# Patient Record
Sex: FEMALE | Race: WHITE | Employment: UNEMPLOYED | URBAN - METROPOLITAN AREA
[De-identification: names, ages, dates, MRNs, and addresses within clinical notes are randomized per-mention and may not be internally consistent; named-entity substitution may affect disease eponyms.]

---

## 2022-01-14 ENCOUNTER — HOSPITAL ENCOUNTER (OUTPATIENT)
Dept: LAB | Age: 37
Discharge: HOME | End: 2022-01-14

## 2022-07-20 ENCOUNTER — TRANSCRIBE ORDER (OUTPATIENT)
Dept: SCHEDULING | Age: 37
End: 2022-07-20

## 2022-07-20 DIAGNOSIS — R20.2 PARESTHESIA: Primary | ICD-10-CM

## 2022-07-21 ENCOUNTER — HOSPITAL ENCOUNTER (OUTPATIENT)
Dept: CT IMAGING | Age: 37
Discharge: HOME | End: 2022-07-21
Attending: NURSE PRACTITIONER
Payer: COMMERCIAL

## 2022-07-21 DIAGNOSIS — R20.2 PARESTHESIA: ICD-10-CM

## 2022-07-21 PROCEDURE — 70450 CT HEAD/BRAIN W/O DYE: CPT

## 2022-07-25 ENCOUNTER — TRANSCRIBE ORDER (OUTPATIENT)
Dept: SCHEDULING | Age: 37
End: 2022-07-25

## 2022-07-25 DIAGNOSIS — R20.2 PARESTHESIA OF SKIN: Primary | ICD-10-CM

## 2022-08-06 ENCOUNTER — HOSPITAL ENCOUNTER (OUTPATIENT)
Dept: MRI IMAGING | Age: 37
Discharge: HOME | End: 2022-08-06
Attending: NURSE PRACTITIONER
Payer: COMMERCIAL

## 2022-08-06 DIAGNOSIS — R20.2 PARESTHESIA OF SKIN: ICD-10-CM

## 2022-08-06 PROCEDURE — 70553 MRI BRAIN STEM W/O & W/DYE: CPT

## 2022-08-06 PROCEDURE — A9576 INJ PROHANCE MULTIPACK: HCPCS

## 2022-08-06 PROCEDURE — 74011250636 HC RX REV CODE- 250/636

## 2022-08-06 RX ADMIN — GADOTERIDOL 15 ML: 279.3 INJECTION, SOLUTION INTRAVENOUS at 08:52

## 2022-08-11 ENCOUNTER — TRANSCRIBE ORDER (OUTPATIENT)
Dept: SCHEDULING | Age: 37
End: 2022-08-11

## 2022-08-11 DIAGNOSIS — R20.2 PARESTHESIA OF SKIN: Primary | ICD-10-CM

## 2022-08-28 ENCOUNTER — HOSPITAL ENCOUNTER (OUTPATIENT)
Dept: MRI IMAGING | Age: 37
Discharge: HOME | End: 2022-08-28
Attending: NURSE PRACTITIONER
Payer: COMMERCIAL

## 2022-08-28 DIAGNOSIS — R20.2 PARESTHESIA OF SKIN: ICD-10-CM

## 2022-08-28 PROCEDURE — 72141 MRI NECK SPINE W/O DYE: CPT

## 2022-08-28 PROCEDURE — 72146 MRI CHEST SPINE W/O DYE: CPT

## 2022-10-14 ENCOUNTER — OFFICE VISIT (OUTPATIENT)
Dept: ENDOCRINOLOGY | Age: 37
End: 2022-10-14
Payer: COMMERCIAL

## 2022-10-14 VITALS — HEIGHT: 65 IN | WEIGHT: 181.2 LBS | BODY MASS INDEX: 30.19 KG/M2

## 2022-10-14 DIAGNOSIS — E03.9 ACQUIRED HYPOTHYROIDISM: Primary | ICD-10-CM

## 2022-10-14 PROCEDURE — 99204 OFFICE O/P NEW MOD 45 MIN: CPT | Performed by: GENERAL ACUTE CARE HOSPITAL

## 2022-10-14 RX ORDER — MODAFINIL 200 MG/1
TABLET ORAL
COMMUNITY
Start: 2022-07-13

## 2022-10-14 RX ORDER — DICLOFENAC SODIUM 75 MG/1
TABLET, DELAYED RELEASE ORAL
COMMUNITY
Start: 2022-10-13

## 2022-10-14 RX ORDER — CHOLECALCIFEROL TAB 125 MCG (5000 UNIT) 125 MCG
TAB ORAL DAILY
COMMUNITY

## 2022-10-14 RX ORDER — LEVOTHYROXINE SODIUM 112 UG/1
112 TABLET ORAL
COMMUNITY
Start: 2022-07-05 | End: 2022-11-01 | Stop reason: SDUPTHER

## 2022-10-14 RX ORDER — FLUTICASONE PROPIONATE 50 MCG
SPRAY, SUSPENSION (ML) NASAL
COMMUNITY
Start: 2022-07-14

## 2022-10-14 NOTE — PATIENT INSTRUCTIONS
Levothyroxine medication instructions:  Please take levothyroxine with a glass of water only, on an empty stomach each morning, 1 hour prior to ingesting any other medications (including vitamins), food, coffee, tea, juice or any other beverages. If you use antacids, medicine to treat high cholesterol (including cholestyramine, colesevelam, colestipol), orlistat, sevelamer, sucralfate, stomach medicine (including lansoprazole, omeprazole, pantoprazole), or any medicine that contains calcium or iron, please take it at least 4 hours before or 4 hours after you take levothyroxine. Cottonseed meal, dietary fiber, soybean flour or walnuts may decrease the absorption of this medicine. All of these can reduce the absorption of thyroid hormone tablets and result in fluctuating levels in the blood and on labs, affecting also how one feels. Please do not eat grapefruit or drink grapefruit juice while you are using this medicine. If you miss a dose you can take it as soon as you remember. However, if it is almost time for your next dose, wait until then and take a regular dose. Do not take extra medicine to make up for a missed dose. Store the medicine in a closed container at room temperature, away from heat, moisture, and direct light. Please keep out of children's reach. You may have to take this medicine for 6 to 8 weeks before your symptoms start to get better. Please complete your lab results today and based on that will decide on further management. Once you have decided with your Neurologist on the infusion plan kindly send us a copy of the office note to fax number [phone].

## 2022-10-14 NOTE — LETTER
10/14/2022    Patient: Venessa Ponce   YOB: 1985   Date of Visit: 10/14/2022     Neeraj Zhou NP  [address] [facility] [address]  Via Fax: [phone]    Dear Neeraj Zhou NP,      Thank you for referring Ms. Ny Castellanos to [facility] for evaluation. My notes for this consultation are attached. If you have questions, please do not hesitate to call me. I look forward to following your patient along with you.       Sincerely,    Mk Segal MD

## 2022-10-14 NOTE — PROGRESS NOTES
REFERRED BY: Amada Dahl NP     REASON: Hypothyroidism    CHIEF COMPLAINT: Evaluation for hypothyroidism    HISTORY OF PRESENT ILLNESS:   Marcy Banks is a 39 y.o. female with a PMHx as noted below who was referred to our endocrinology clinic for evaluation of hypothyroidism. Following with PCP since diagnosis    Patient complaining of wt loss and her thyroid dose needed adjustment as it was too high for her    Multiple Sclerosis recently diagnosed by Neurologist Dr Char Morin per patient, she had numbness on Rt side of the face, she has a plan to get the infusion treatment but date is not set yet    Thyroid History:  Diagnosed in 1998  Patient denies history of radiation exposure or trauma/surgery  Family history of thyroid disease is Positive sister hypothyroidism  Currently taking levothyroxine 112mcg daily  The patient admits to taking it 1 hour before breakfast on an empty stomach. Patient denies palpitations, diaphoresis, weight changes, fatigue, tremor, or chest discomfort. Hair loss ongoing stable  Patient lost 50lbs in the past 1.5 yrs not intentional, she did have a lower appetite and more physical activity  Menstrual cycle normal and regular    06/29/2022  TSH 0.272  FT4 1.57  TT3  81  No dose adjustment was made with these test results, she has been on the same dose this year    Review of most recent thyroid function:  No results found for: TSH, FT4, T4, [id number], T4, 177202, T3LT, 530174, TSILT, TRALT, TMCLT, TGAB, TSHEXT, TSHEXT   Thyroid Lab Key:  TSILT = Thyroid stimulating antibodies  TRALT = TSH Receptor Antibodies  TMCLT = TPO antibodies  T3LT = Total T3 levels  770343 = Direct FT4  919905 = Free T3    PAST MEDICAL/SURGICAL HISTORY:   No past medical history on file. No past surgical history on file.     ALLERGIES:   Allergies   Allergen Reactions    Amoxicillin Rash       MEDICATIONS ON ADMISSION:     Current Outpatient Medications:     diclofenac EC (VOLTAREN) 75 mg EC tablet, , Disp: , Rfl: fluticasone propionate (FLONASE) 50 mcg/actuation nasal spray, SHAKE LIQUID AND USE 1 SPRAY IN EACH NOSTRIL EVERY DAY FOR 3 TO 4 DAYS THEN AS NEEDED, Disp: , Rfl:     modafiniL (PROVIGIL) 200 mg tablet, TAKE 1/2 TO 1 TABLET BY MOUTH EVERY MORNING. MAY TAKE AN ADDITIONAL 1/2 TO 1 TABLET AT NOONTIME IF NEEDED, Disp: , Rfl:     levothyroxine (SYNTHROID) 112 mcg tablet, Take 112 mcg by mouth., Disp: , Rfl:     cholecalciferol (VITAMIN D3) (5000 Units/125 mcg) tab tablet, Take  by mouth daily. , Disp: , Rfl:     SOCIAL HISTORY:   Social History     Socioeconomic History    Marital status:      Spouse name: Not on file    Number of children: Not on file    Years of education: Not on file    Highest education level: Not on file   Occupational History    Not on file   Tobacco Use    Smoking status: Never    Smokeless tobacco: Never   Substance and Sexual Activity    Alcohol use: Not on file    Drug use: Not on file    Sexual activity: Not on file   Other Topics Concern    Not on file   Social History Narrative    Not on file     Social Determinants of Health     Financial Resource Strain: Not on file   Food Insecurity: Not on file   Transportation Needs: Not on file   Physical Activity: Not on file   Stress: Not on file   Social Connections: Not on file   Intimate Partner Violence: Not on file   Housing Stability: Not on file       FAMILY HISTORY:  No family history on file. REVIEW OF SYSTEMS: Complete ROS assessed and noted for that which is described above, all else are negative.   Eyes: normal  ENT: normal  CVS: normal  Resp: normal  GI: normal  : normal  GYN: normal  Endocrine: normal  Integument: normal  Musculoskeletal: normal  Neuro: normal  Psych: normal      PHYSICAL EXAMINATION:    VITAL SIGNS:  Visit Vitals  Ht 5' 5\" (1.651 m)   Wt 181 lb 3.2 oz (82.2 kg)   BMI 30.15 kg/m²     Last 3 Recorded Weights in this Encounter    10/14/22 0831   Weight: 181 lb 3.2 oz (82.2 kg)        GENERAL: NCAT, Sitting comfortably, NAD  EYES: EOMI, non-icteric, no proptosis  Ear/Nose/Throat: NCAT, no inflammation, no masses, thyroid gland is not enlarged, trachea central, no LAD  LYMPH NODES: No LAD  CARDIOVASCULAR: S1 S2, RRR, No murmur, 2+ radial pulses  RESPIRATORY: CTA b/l, no wheeze/rales  GASTROINTESTINAL: ND  MUSCULOSKELETAL: Normal ROM, no atrophy  SKIN: warm, no edema/rash/ or other skin changes  NEUROLOGIC: 5/5 power all extremities, no tremor, AAOx3  PSYCHIATRIC: Normal affect, Normal insight and judgement       REVIEW OF LABORATORY AND RADIOLOGY DATA:   Labs and documentation have been reviewed as described above. ASSESSMENT AND PLAN:   Misa Rae is a 39 y.o. female with a PMHx as noted above who was referred to our endocrinology clinic for evaluation of hypothyroidism. Hypothyroidism    Today, we spent time discussing the physiologic mechanisms which govern thyroid hormone regulation and the normal responses to abnormal thyroid function. We also discussed their current condition in the setting of this physiologic response. Today we will check a TSH & FT4 level to determine if patient is euthyroid on current regimen. Continue with 112 mcg of levothyroxine daily, may switch to name brand to ensure adequate absorption levels  Once she is on the infusion treatment she may have alterations to her thyroid levels due to that, asked to have dr Mei Jacobo fax over the office notes in her next visit with them  Discussed the best way to take thyroid hormone each morning.     Plan to RTC in 3 months    Aranza Begum MD   [facility]

## 2022-10-15 LAB
T4 FREE SERPL-MCNC: 1.91 NG/DL (ref 0.82–1.77)
THYROPEROXIDASE AB SERPL-ACNC: <8 IU/ML (ref 0–34)
TSH SERPL DL<=0.005 MIU/L-ACNC: 0.34 UIU/ML (ref 0.45–4.5)

## 2022-11-01 DIAGNOSIS — E03.9 ACQUIRED HYPOTHYROIDISM: Primary | ICD-10-CM

## 2022-11-01 RX ORDER — LEVOTHYROXINE SODIUM 112 UG/1
TABLET ORAL
Qty: 26 TABLET | Refills: 1 | Status: SHIPPED | OUTPATIENT
Start: 2022-11-01 | End: 2022-11-04 | Stop reason: SDUPTHER

## 2022-11-02 ENCOUNTER — PATIENT MESSAGE (OUTPATIENT)
Dept: ENDOCRINOLOGY | Age: 37
End: 2022-11-02

## 2022-11-02 DIAGNOSIS — E03.9 ACQUIRED HYPOTHYROIDISM: ICD-10-CM

## 2022-11-04 RX ORDER — LEVOTHYROXINE SODIUM 112 UG/1
TABLET ORAL
Qty: 26 TABLET | Refills: 2 | Status: SHIPPED | OUTPATIENT
Start: 2022-11-04

## 2022-12-27 DIAGNOSIS — E03.9 ACQUIRED HYPOTHYROIDISM: ICD-10-CM

## 2023-01-17 ENCOUNTER — OFFICE VISIT (OUTPATIENT)
Dept: ENDOCRINOLOGY | Age: 38
End: 2023-01-17
Payer: COMMERCIAL

## 2023-01-17 VITALS
DIASTOLIC BLOOD PRESSURE: 62 MMHG | HEART RATE: 76 BPM | HEIGHT: 65 IN | SYSTOLIC BLOOD PRESSURE: 95 MMHG | BODY MASS INDEX: 30.62 KG/M2 | WEIGHT: 183.8 LBS

## 2023-01-17 DIAGNOSIS — E03.9 ACQUIRED HYPOTHYROIDISM: Primary | ICD-10-CM

## 2023-01-17 PROCEDURE — 99213 OFFICE O/P EST LOW 20 MIN: CPT | Performed by: GENERAL ACUTE CARE HOSPITAL

## 2023-01-17 RX ORDER — LEVOTHYROXINE SODIUM 112 UG/1
TABLET ORAL
Qty: 90 TABLET | Refills: 2 | Status: SHIPPED | OUTPATIENT
Start: 2023-01-17

## 2023-01-17 NOTE — PROGRESS NOTES
REFERRED BY: Jennifer Oseguera NP     REASON: Hypothyroidism    CHIEF COMPLAINT: Evaluation for hypothyroidism    HISTORY OF PRESENT ILLNESS:   Liliana Varner is a 40 y.o. female with a PMHx as noted below who was referred to our endocrinology clinic for evaluation of hypothyroidism. Following with PCP since diagnosis    In the last visit we cut back with Tesha's levothyroxine 112 mcg from 7 days a week to 6 days a week and she has been taking that, she mentions she feels really tired on the day she does not take her levothyroxine but denies any other complaint at this time, her menstrual cycle has been regular, her weight has been steady. She is scheduled to start infusion treatment for her multiple sclerosis with Dr. Yana Odell in 1 week, indicates no steroids will be used at that time    Component      Latest Ref Rng & Units 1/12/2023 10/14/2022   T4, Free      0.82 - 1.77 ng/dL 1.07 1.91 (H)   TSH      0.450 - 4.500 uIU/mL 5.200 (H) 0.339 (L)   Thyroid peroxidase Ab      0 - 34 IU/mL  <8       On presentation:  Multiple Sclerosis recently diagnosed by Neurologist Dr Yana Odell per patient, she had numbness on Rt side of the face, she has a plan to get the infusion treatment but date is not set yet      Thyroid History:  Diagnosed in 1998  Patient denies history of radiation exposure or trauma/surgery  Family history of thyroid disease is Positive sister hypothyroidism  Currently taking levothyroxine 112mcg daily  The patient admits to taking it 1 hour before breakfast on an empty stomach. Patient denies palpitations, diaphoresis, weight changes, fatigue, tremor, or chest discomfort.  Hair loss ongoing stable  Patient lost 50lbs in the past 1.5 yrs not intentional, she did have a lower appetite and more physical activity  Menstrual cycle normal and regular    06/29/2022  TSH 0.272  FT4 1.57  TT3  81  No dose adjustment was made with these test results, she has been on the same dose this year    Review of most recent thyroid function:  Lab Results   Component Value Date    TSH 5.200 (H) 01/12/2023    TSH 0.339 (L) 10/14/2022    FT4 1.07 01/12/2023    FT4 1.91 (H) 10/14/2022    TMCLT <8 10/14/2022      Thyroid Lab Key:  TSILT = Thyroid stimulating antibodies  TRALT = TSH Receptor Antibodies  TMCLT = TPO antibodies  T3LT = Total T3 levels  654358 = Direct FT4  330970 = Free T3    PAST MEDICAL/SURGICAL HISTORY:   No past medical history on file. No past surgical history on file. ALLERGIES:   Allergies   Allergen Reactions    Amoxicillin Rash       MEDICATIONS ON ADMISSION:     Current Outpatient Medications:     levothyroxine (SYNTHROID) 112 mcg tablet, Take daily for 6 days a week only, Disp: 26 Tablet, Rfl: 2    modafiniL (PROVIGIL) 200 mg tablet, TAKE 1/2 TO 1 TABLET BY MOUTH EVERY MORNING. MAY TAKE AN ADDITIONAL 1/2 TO 1 TABLET AT NOONTIME IF NEEDED, Disp: , Rfl:     cholecalciferol (VITAMIN D3) (5000 Units/125 mcg) tab tablet, Take  by mouth daily. , Disp: , Rfl:     diclofenac EC (VOLTAREN) 75 mg EC tablet, , Disp: , Rfl:     fluticasone propionate (FLONASE) 50 mcg/actuation nasal spray, SHAKE LIQUID AND USE 1 SPRAY IN EACH NOSTRIL EVERY DAY FOR 3 TO 4 DAYS THEN AS NEEDED (Patient not taking: Reported on 1/17/2023), Disp: , Rfl:     SOCIAL HISTORY:   Social History     Socioeconomic History    Marital status: LEGALLY      Spouse name: Not on file    Number of children: Not on file    Years of education: Not on file    Highest education level: Not on file   Occupational History    Not on file   Tobacco Use    Smoking status: Never    Smokeless tobacco: Never   Substance and Sexual Activity    Alcohol use: Not on file    Drug use: Not on file    Sexual activity: Not on file   Other Topics Concern    Not on file   Social History Narrative    Not on file     Social Determinants of Health     Financial Resource Strain: Not on file   Food Insecurity: Not on file   Transportation Needs: Not on file   Physical Activity: Not on file   Stress: Not on file   Social Connections: Not on file   Intimate Partner Violence: Not on file   Housing Stability: Not on file       FAMILY HISTORY:  No family history on file. REVIEW OF SYSTEMS: Complete ROS assessed and noted for that which is described above, all else are negative. Eyes: normal  ENT: normal  CVS: normal  Resp: normal  GI: normal  : normal  GYN: normal  Endocrine: normal  Integument: normal  Musculoskeletal: normal  Neuro: normal  Psych: normal      PHYSICAL EXAMINATION:    VITAL SIGNS:  Visit Vitals  BP 95/62   Pulse 76   Ht 5' 5\" (1.651 m)   Wt 183 lb 12.8 oz (83.4 kg)   BMI 30.59 kg/m²       Last 3 Recorded Weights in this Encounter    01/17/23 0913   Weight: 183 lb 12.8 oz (83.4 kg)          GENERAL: NCAT, Sitting comfortably, NAD  EYES: EOMI, non-icteric, no proptosis  Ear/Nose/Throat: NCAT, no inflammation, no masses, thyroid gland is not enlarged, trachea central, no LAD  LYMPH NODES: No LAD  CARDIOVASCULAR: S1 S2, RRR, No murmur, 2+ radial pulses  RESPIRATORY: CTA b/l, no wheeze/rales  GASTROINTESTINAL: ND  MUSCULOSKELETAL: Normal ROM, no atrophy  SKIN: warm, no edema/rash/ or other skin changes  NEUROLOGIC: 5/5 power all extremities, no tremor, AAOx3  PSYCHIATRIC: Normal affect, Normal insight and judgement       REVIEW OF LABORATORY AND RADIOLOGY DATA:   Labs and documentation have been reviewed as described above. ASSESSMENT AND PLAN:   Black Becerra is a 40 y.o. female with a PMHx as noted above who was referred to our endocrinology clinic for evaluation of hypothyroidism. Hypothyroidism    Given her TSH is above normal and she feels tired on the day she skips her levothyroxine plan to adjust her thyroid medication as follows:  - levothyroxine 112 mcg for 1 tablet daily Monday to Saturday and take half a tablet on Sunday.   Repeat labs 1 week before the next appointment in 8 weeks    Once she is on the infusion treatment she may have alterations to her thyroid levels due to that, asked to have dr Culver Fails fax over the office notes in her next visit with them  Discussed the best way to take thyroid hormone each morning.     Plan to RTC in 3 months    Jocelyn Aly MD   [facility]

## 2023-01-17 NOTE — PATIENT INSTRUCTIONS
Please take levothyroxine 112 mcg for 1 tablet daily Monday to Saturday and take half a tablet on Sunday. PPlease repeat the lab levels 1 week before your upcoming appointment . Please follow the instructions below on how to take it for the best consistent levels:      Levothyroxine medication instructions:  Please take levothyroxine with a glass of water only, on an empty stomach each morning, 1 hour prior to ingesting any other medications (including vitamins), food, coffee, tea, juice or any other beverages. If you use antacids, medicine to treat high cholesterol (including cholestyramine, colesevelam, colestipol), orlistat, sevelamer, sucralfate, stomach medicine (including lansoprazole, omeprazole, pantoprazole), or any medicine that contains calcium or iron, please take it at least 4 hours before or 4 hours after you take levothyroxine. Cottonseed meal, dietary fiber, soybean flour or walnuts may decrease the absorption of this medicine. All of these can reduce the absorption of thyroid hormone tablets and result in fluctuating levels in the blood and on labs, affecting also how one feels. Please do not eat grapefruit or drink grapefruit juice while you are using this medicine. If you miss a dose you can take it as soon as you remember. However, if it is almost time for your next dose, wait until then and take a regular dose. Do not take extra medicine to make up for a missed dose. Store the medicine in a closed container at room temperature, away from heat, moisture, and direct light. Please keep out of children's reach. You may have to take this medicine for 6 to 8 weeks before your symptoms start to get better. Once you have decided with your Neurologist on the infusion plan kindly send us a copy of the office note to fax number [phone].

## 2023-03-17 DIAGNOSIS — E03.9 ACQUIRED HYPOTHYROIDISM: ICD-10-CM

## 2023-03-18 LAB
T4 FREE SERPL-MCNC: 1.1 NG/DL (ref 0.82–1.77)
TSH SERPL DL<=0.005 MIU/L-ACNC: 9.02 UIU/ML (ref 0.45–4.5)

## 2023-03-21 ENCOUNTER — OFFICE VISIT (OUTPATIENT)
Dept: ENDOCRINOLOGY | Age: 38
End: 2023-03-21
Payer: COMMERCIAL

## 2023-03-21 VITALS
HEIGHT: 66 IN | BODY MASS INDEX: 31.47 KG/M2 | DIASTOLIC BLOOD PRESSURE: 73 MMHG | WEIGHT: 195.8 LBS | HEART RATE: 74 BPM | SYSTOLIC BLOOD PRESSURE: 119 MMHG

## 2023-03-21 DIAGNOSIS — E03.9 ACQUIRED HYPOTHYROIDISM: Primary | ICD-10-CM

## 2023-03-21 PROCEDURE — 99213 OFFICE O/P EST LOW 20 MIN: CPT | Performed by: GENERAL ACUTE CARE HOSPITAL

## 2023-03-21 RX ORDER — LEVOTHYROXINE SODIUM 112 UG/1
TABLET ORAL
Qty: 100 TABLET | Refills: 2 | Status: SHIPPED | OUTPATIENT
Start: 2023-03-21

## 2023-03-21 NOTE — LETTER
3/21/2023    Patient: Zehra Rich   YOB: 1985   Date of Visit: 3/21/2023     La Nena Dinh NP  [address] [facility] [address]  Via Fax: [phone]    Dear La Nena Dinh NP,      Thank you for referring Ms. Cherylene Edison to [facility] for evaluation. My notes for this consultation are attached. If you have questions, please do not hesitate to call me. I look forward to following your patient along with you.       Sincerely,    Sparkle England MD

## 2023-03-21 NOTE — PROGRESS NOTES
REFERRED BY: Wilner Pickering NP     REASON: Hypothyroidism    CHIEF COMPLAINT: Evaluation for hypothyroidism    HISTORY OF PRESENT ILLNESS:   Alexsander Vera is a 40 y.o. female with a PMHx as noted below who was referred to our endocrinology clinic for evaluation of hypothyroidism. Following with PCP since diagnosis    In the last visit we cut back with Tesha's levothyroxine 112 mcg from 7 days a week to 6 days a week and half tablet on Sunday, she has started her infusion of Ocrelizumab for MS and had 2 infusions on (03/8 and 03/22) and received methylprednisolone 125mcg daily. She has been taking that, she mentions she feels really tired on the day she does not take her levothyroxine but denies any other complaint at this time, her menstrual cycle has been regular, her weight has been steady. Component      Latest Ref Rng & Units 3/17/2023 1/12/2023 10/14/2022   Thyroid peroxidase Ab      0 - 34 IU/mL   <8   TSH      0.450 - 4.500 uIU/mL 9.020 (H) 5.200 (H)    T4, Free      0.82 - 1.77 ng/dL 1.10 1.07        On presentation:  Multiple Sclerosis recently diagnosed by Neurologist Dr Thad Ding per patient, she had numbness on Rt side of the face, she has a plan to get the infusion treatment but date is not set yet      Thyroid History:  Diagnosed in 1998  Patient denies history of radiation exposure or trauma/surgery  Family history of thyroid disease is Positive sister hypothyroidism  Currently taking levothyroxine 112mcg daily  The patient admits to taking it 1 hour before breakfast on an empty stomach. Patient denies palpitations, diaphoresis, weight changes, fatigue, tremor, or chest discomfort.  Hair loss ongoing stable  Patient lost 50lbs in the past 1.5 yrs not intentional, she did have a lower appetite and more physical activity  Menstrual cycle normal and regular    06/29/2022  TSH 0.272  FT4 1.57  TT3  81  No dose adjustment was made with these test results, she has been on the same dose this year    Review of most recent thyroid function:  Lab Results   Component Value Date    TSH 9.020 (H) 03/17/2023    TSH 5.200 (H) 01/12/2023    TSH 0.339 (L) 10/14/2022    FT4 1.10 03/17/2023    FT4 1.07 01/12/2023    FT4 1.91 (H) 10/14/2022    TMCLT <8 10/14/2022      Thyroid Lab Key:  TSILT = Thyroid stimulating antibodies  TRALT = TSH Receptor Antibodies  TMCLT = TPO antibodies  T3LT = Total T3 levels  230363 = Direct FT4  266440 = Free T3    PAST MEDICAL/SURGICAL HISTORY:   No past medical history on file. No past surgical history on file. ALLERGIES:   Allergies   Allergen Reactions    Amoxicillin Rash       MEDICATIONS ON ADMISSION:     Current Outpatient Medications:     ocrelizumab (OCREVUS IV), by IntraVENous route every 6 months., Disp: , Rfl:     levothyroxine (SYNTHROID) 112 mcg tablet, Take daily for 6 days a week Mon to Sat and take half tablet on Sunday, Disp: 90 Tablet, Rfl: 2    modafiniL (PROVIGIL) 200 mg tablet, TAKE 1/2 TO 1 TABLET BY MOUTH EVERY MORNING. MAY TAKE AN ADDITIONAL 1/2 TO 1 TABLET AT NOONTIME IF NEEDED, Disp: , Rfl:     cholecalciferol (VITAMIN D3) (5000 Units/125 mcg) tab tablet, Take  by mouth daily. , Disp: , Rfl:     diclofenac EC (VOLTAREN) 75 mg EC tablet, , Disp: , Rfl:     fluticasone propionate (FLONASE) 50 mcg/actuation nasal spray, SHAKE LIQUID AND USE 1 SPRAY IN EACH NOSTRIL EVERY DAY FOR 3 TO 4 DAYS THEN AS NEEDED (Patient not taking: No sig reported), Disp: , Rfl:     SOCIAL HISTORY:   Social History     Socioeconomic History    Marital status: LEGALLY      Spouse name: Not on file    Number of children: Not on file    Years of education: Not on file    Highest education level: Not on file   Occupational History    Not on file   Tobacco Use    Smoking status: Never    Smokeless tobacco: Never   Substance and Sexual Activity    Alcohol use: Not on file    Drug use: Not on file    Sexual activity: Not on file   Other Topics Concern    Not on file   Social History Narrative    Not on file     Social Determinants of Health     Financial Resource Strain: Not on file   Food Insecurity: Not on file   Transportation Needs: Not on file   Physical Activity: Not on file   Stress: Not on file   Social Connections: Not on file   Intimate Partner Violence: Not on file   Housing Stability: Not on file       FAMILY HISTORY:  No family history on file. REVIEW OF SYSTEMS: Complete ROS assessed and noted for that which is described above, all else are negative. Eyes: normal  ENT: normal  CVS: normal  Resp: normal  GI: normal  : normal  GYN: normal  Endocrine: normal  Integument: normal  Musculoskeletal: normal  Neuro: normal  Psych: normal      PHYSICAL EXAMINATION:    VITAL SIGNS:  Visit Vitals  /73   Pulse 74   Ht 5' 6\" (1.676 m)   Wt 195 lb 12.8 oz (88.8 kg)   BMI 31.60 kg/m²       Last 3 Recorded Weights in this Encounter    03/21/23 1003   Weight: 195 lb 12.8 oz (88.8 kg)          GENERAL: NCAT, Sitting comfortably, NAD  EYES: EOMI, non-icteric, no proptosis  Ear/Nose/Throat: NCAT, no inflammation, no masses, thyroid gland is not enlarged, trachea central, no LAD  LYMPH NODES: No LAD  CARDIOVASCULAR: S1 S2, RRR, No murmur, 2+ radial pulses  RESPIRATORY: CTA b/l, no wheeze/rales  GASTROINTESTINAL: ND  MUSCULOSKELETAL: Normal ROM, no atrophy  SKIN: warm, no edema/rash/ or other skin changes  NEUROLOGIC: 5/5 power all extremities, no tremor, AAOx3  PSYCHIATRIC: Normal affect, Normal insight and judgement     REVIEW OF LABORATORY AND RADIOLOGY DATA:   Labs and documentation have been reviewed as described above. ASSESSMENT AND PLAN:   Crissy Francois is a 40 y.o. female with a PMHx as noted above who was referred to our endocrinology clinic for evaluation of hypothyroidism. Hypothyroidism    Given her TSH is elevated increase the levothyroxine 112 mcg for 1 tablet daily and take additional half a tablet on Sunday.   Repeat labs 1 week before the next appointment in 8 weeks    Once she is on the infusion treatment she may have alterations to her thyroid levels due to that  Discussed the best way to take thyroid hormone each morning.     Plan to RTC in 3 months    Shekhar Ruiz MD   [facility]

## 2023-03-21 NOTE — PATIENT INSTRUCTIONS
Please take levothyroxine 112 mcg daily and take an additional half a tablet on Sunday. Please repeat the lab levels in 8 weeks (2-3 days before the next appt). Please follow the instructions below on how to take it for the best consistent levels:      Levothyroxine medication instructions:  Please take levothyroxine with a glass of water only, on an empty stomach each morning, 1 hour prior to ingesting any other medications (including vitamins), food, coffee, tea, juice or any other beverages. If you use antacids, medicine to treat high cholesterol (including cholestyramine, colesevelam, colestipol), orlistat, sevelamer, sucralfate, stomach medicine (including lansoprazole, omeprazole, pantoprazole), or any medicine that contains calcium or iron, please take it at least 4 hours before or 4 hours after you take levothyroxine. Cottonseed meal, dietary fiber, soybean flour or walnuts may decrease the absorption of this medicine. All of these can reduce the absorption of thyroid hormone tablets and result in fluctuating levels in the blood and on labs, affecting also how one feels. Please do not eat grapefruit or drink grapefruit juice while you are using this medicine. If you miss a dose you can take it as soon as you remember. However, if it is almost time for your next dose, wait until then and take a regular dose. Do not take extra medicine to make up for a missed dose. Store the medicine in a closed container at room temperature, away from heat, moisture, and direct light. Please keep out of children's reach. You may have to take this medicine for 6 to 8 weeks before your symptoms start to get better.

## 2023-05-12 LAB
T4 FREE SERPL-MCNC: 1.48 NG/DL (ref 0.82–1.77)
TSH SERPL DL<=0.005 MIU/L-ACNC: 6.14 UIU/ML (ref 0.45–4.5)

## 2023-05-16 DIAGNOSIS — E03.9 ACQUIRED HYPOTHYROIDISM: ICD-10-CM

## 2023-05-16 NOTE — LETTER
1/17/2023    Patient: Black Becerra   YOB: 1985   Date of Visit: 1/17/2023     Shobha Ramos NP  [address] [facility] [address]  Via Fax: [phone]    Dear Shobha Ramos NP,      Thank you for referring Ms. Andrew Schroeder to [facility] for evaluation. My notes for this consultation are attached. If you have questions, please do not hesitate to call me. I look forward to following your patient along with you.       Sincerely,    Crys Jean MD
C/O left lower back pain that radiates down her leg after twisting the wrong way while doing yard work.

## 2023-05-17 ENCOUNTER — OFFICE VISIT (OUTPATIENT)
Age: 38
End: 2023-05-17
Payer: MEDICAID

## 2023-05-17 VITALS
BODY MASS INDEX: 31.07 KG/M2 | HEIGHT: 66 IN | DIASTOLIC BLOOD PRESSURE: 61 MMHG | HEART RATE: 70 BPM | SYSTOLIC BLOOD PRESSURE: 90 MMHG | WEIGHT: 193.3 LBS

## 2023-05-17 DIAGNOSIS — E03.9 ACQUIRED HYPOTHYROIDISM: Primary | ICD-10-CM

## 2023-05-17 PROCEDURE — 99213 OFFICE O/P EST LOW 20 MIN: CPT | Performed by: GENERAL ACUTE CARE HOSPITAL

## 2023-05-17 RX ORDER — LEVOTHYROXINE SODIUM 112 UG/1
TABLET ORAL
Qty: 102 TABLET | Refills: 0 | Status: SHIPPED | OUTPATIENT
Start: 2023-05-17

## 2023-05-17 NOTE — PROGRESS NOTES
recognition software. Quite often unanticipated grammatical, syntax, homophones, and other interpretive errors are inadvertently transcribed by the computer software. Efforts were made to correct these errors in proofreading. Please excuse any errors that have escaped final proofreading. Thank you.      Jeremy Buck MD   [facility]

## 2023-05-17 NOTE — PATIENT INSTRUCTIONS
Please increase the levothyroxine to 112mcg 1 tab daily on Sat take 2 tabs. Please complete the blood test in 12 weeks and based on the results will decide on the next steps in management. Levothyroxine medication instructions:  Please take levothyroxine with a glass of water only, on an empty stomach each morning, 1 hour prior to ingesting any other medications (including vitamins), food, coffee, tea, juice or any other beverages. If you use antacids, medicine to treat high cholesterol (including cholestyramine, colesevelam, colestipol), orlistat, sevelamer, sucralfate, stomach medicine (including lansoprazole, omeprazole, pantoprazole), or any medicine that contains calcium or iron, please take it at least 4 hours before or 4 hours after you take levothyroxine. Cottonseed meal, dietary fiber, soybean flour or walnuts may decrease the absorption of this medicine. All of these can reduce the absorption of thyroid hormone tablets and result in fluctuating levels in the blood and on labs, affecting also how one feels. Please do not eat grapefruit or drink grapefruit juice while you are using this medicine. If you miss a dose you can take it as soon as you remember. However, if it is almost time for your next dose, wait until then and take a regular dose. Do not take extra medicine to make up for a missed dose. Store the medicine in a closed container at room temperature, away from heat, moisture, and direct light. Please keep out of children's reach. You may have to take this medicine for 6 to 8 weeks before your symptoms start to get better.

## 2023-07-11 DIAGNOSIS — E03.9 ACQUIRED HYPOTHYROIDISM: ICD-10-CM

## 2023-08-09 DIAGNOSIS — E03.9 ACQUIRED HYPOTHYROIDISM: ICD-10-CM

## 2023-08-11 LAB
T4 FREE SERPL-MCNC: 1.46 NG/DL (ref 0.82–1.77)
TSH SERPL DL<=0.005 MIU/L-ACNC: 0.87 UIU/ML (ref 0.45–4.5)

## 2023-08-22 ENCOUNTER — OFFICE VISIT (OUTPATIENT)
Age: 38
End: 2023-08-22
Payer: MEDICAID

## 2023-08-22 VITALS
HEART RATE: 68 BPM | SYSTOLIC BLOOD PRESSURE: 116 MMHG | HEIGHT: 66 IN | WEIGHT: 193.6 LBS | BODY MASS INDEX: 31.12 KG/M2 | DIASTOLIC BLOOD PRESSURE: 74 MMHG

## 2023-08-22 DIAGNOSIS — E03.9 ACQUIRED HYPOTHYROIDISM: Primary | ICD-10-CM

## 2023-08-22 PROCEDURE — 99213 OFFICE O/P EST LOW 20 MIN: CPT | Performed by: GENERAL ACUTE CARE HOSPITAL

## 2023-08-22 RX ORDER — LEVOTHYROXINE SODIUM 112 UG/1
TABLET ORAL
Qty: 102 TABLET | Refills: 1 | Status: SHIPPED | OUTPATIENT
Start: 2023-08-22

## 2023-08-22 NOTE — PATIENT INSTRUCTIONS
Keep up the good work! Continue the current dose of levothyroxine 112mcg and 2 tabs on Saturday  Please obtain blood test 1 week before the next visit    Levothyroxine medication instructions:  Please take levothyroxine with a glass of water only, on an empty stomach each morning, 1 hour prior to ingesting any other medications (including vitamins), food, coffee, tea, juice or any other beverages. If you use antacids, medicine to treat high cholesterol (including cholestyramine, colesevelam, colestipol), orlistat, sevelamer, sucralfate, stomach medicine (including lansoprazole, omeprazole, pantoprazole), or any medicine that contains calcium or iron, please take it at least 4 hours before or 4 hours after you take levothyroxine. Cottonseed meal, dietary fiber, soybean flour or walnuts may decrease the absorption of this medicine. All of these can reduce the absorption of thyroid hormone tablets and result in fluctuating levels in the blood and on labs, affecting also how one feels. Please do not eat grapefruit or drink grapefruit juice while you are using this medicine. If you miss a dose you can take it as soon as you remember. However, if it is almost time for your next dose, wait until then and take a regular dose. Do not take extra medicine to make up for a missed dose. Store the medicine in a closed container at room temperature, away from heat, moisture, and direct light. Please keep out of children's reach. You may have to take this medicine for 6 to 8 weeks before your symptoms start to get better.

## 2023-08-22 NOTE — PROGRESS NOTES
continue the same dose of levothyroxine 112 mcg for 1 extra tablet on Saturday. Repeat labs 1 week before the next appointment   Discussed the best way to take thyroid hormone each morning. Plan to RTC in 4 months    Please note that this dictation was completed with [employer], the computer voice recognition software. Quite often unanticipated grammatical, syntax, homophones, and other interpretive errors are inadvertently transcribed by the computer software. Efforts were made to correct these errors in proofreading. Please excuse any errors that have escaped final proofreading. Thank you. Lg Morales MD   [facility]     Patient Instructions   Keep up the good work! Continue the current dose of levothyroxine 112mcg and 2 tabs on Saturday  Please obtain blood test 1 week before the next visit    Levothyroxine medication instructions:  Please take levothyroxine with a glass of water only, on an empty stomach each morning, 1 hour prior to ingesting any other medications (including vitamins), food, coffee, tea, juice or any other beverages. If you use antacids, medicine to treat high cholesterol (including cholestyramine, colesevelam, colestipol), orlistat, sevelamer, sucralfate, stomach medicine (including lansoprazole, omeprazole, pantoprazole), or any medicine that contains calcium or iron, please take it at least 4 hours before or 4 hours after you take levothyroxine. Cottonseed meal, dietary fiber, soybean flour or walnuts may decrease the absorption of this medicine. All of these can reduce the absorption of thyroid hormone tablets and result in fluctuating levels in the blood and on labs, affecting also how one feels. Please do not eat grapefruit or drink grapefruit juice while you are using this medicine. If you miss a dose you can take it as soon as you remember. However, if it is almost time for your next dose, wait until then and take a regular dose.  Do not take extra

## 2024-01-09 LAB
T4 FREE SERPL-MCNC: 1.6 NG/DL (ref 0.82–1.77)
TSH SERPL DL<=0.005 MIU/L-ACNC: 0.42 UIU/ML (ref 0.45–4.5)

## 2024-01-22 DIAGNOSIS — E03.9 ACQUIRED HYPOTHYROIDISM: ICD-10-CM

## 2024-01-23 ENCOUNTER — TELEMEDICINE (OUTPATIENT)
Age: 39
End: 2024-01-23
Payer: MEDICAID

## 2024-01-23 DIAGNOSIS — E03.9 ACQUIRED HYPOTHYROIDISM: Primary | ICD-10-CM

## 2024-01-23 PROCEDURE — 99213 OFFICE O/P EST LOW 20 MIN: CPT | Performed by: GENERAL ACUTE CARE HOSPITAL

## 2024-01-23 RX ORDER — LEVOTHYROXINE SODIUM 112 UG/1
TABLET ORAL
Qty: 90 TABLET | Refills: 3 | Status: SHIPPED | OUTPATIENT
Start: 2024-01-23

## 2024-01-23 NOTE — PROGRESS NOTES
[facility]  DR LIUDMILA Hintonmaribell Kwok, was evaluated through a synchronous (real-time) audio-video encounter. The patient (or guardian if applicable) is aware that this is a billable service, which includes applicable co-pays. This Virtual Visit was conducted with patient's (and/or legal guardian's) consent. Patient identification was verified, and a caregiver was present when appropriate.   The patient was located at Home: [address] [place] [address]  Provider was located at Facility (Appt Dept): [address] [place] [address] [address]  [place] [address]    --Liudmila Santos MD on 1/23/2024 at 9:44 AM    An electronic signature was used to authenticate this note.     REFERRED BY: May Blanco NP     REASON: Hypothyroidism    CHIEF COMPLAINT: Evaluation for hypothyroidism    HISTORY OF PRESENT ILLNESS:   Delmi Kwok is a 38 y.o. female with a PMHx as noted below who was referred to our endocrinology clinic for evaluation of hypothyroidism.    1/23/24    Ms Awad denies new complaints, has been compliant with levothyroxine 112 mcg daily with 2 tabs on Saturday  Denies symptoms of hyperthyroidism    Review of most recent thyroid function:  Lab Results   Component Value Date    TSH 0.423 (L) 01/08/2024    TSH 0.874 08/10/2023    TSH 6.140 (H) 05/11/2023    T4FREE 1.60 01/08/2024    T4FREE 1.46 08/10/2023    T4FREE 1.48 05/11/2023      Thyroid Lab Key:  TSILT = Thyroid stimulating antibodies  TRALT = TSH Receptor Antibodies  TMCLT = TPO antibodies  T3LT = Total T3 levels  [id number] = Direct FT4  [id number] = Free T3         8/22/23    Feels tired, denies other new complaints. She takes levothyroxine correctly.     Component      Latest Ref Rng 5/11/2023 8/10/2023   TSH      0.450 - 4.500 uIU/mL 6.140 (H)  0.874    T4 Free      0.82 - 1.77 ng/dL 1.48  1.46       Legend:  (H) High    Previous visit 05/17/23:  Following with PCP since diagnosis    In the last visit we

## 2024-04-23 DIAGNOSIS — E03.9 ACQUIRED HYPOTHYROIDISM: ICD-10-CM

## 2025-03-19 RX ORDER — FERROUS SULFATE 325(65) MG
325 TABLET ORAL
COMMUNITY

## 2025-03-19 NOTE — PERIOP NOTE
[facility]  Ambulatory Surgery Unit  [address] [place] [address]  [address]  Pre-operative Instructions    Surgery/Procedure Date  Monday 3/24            Tentative Arrival Time TBD      1. On the day of your surgery/procedure, please report to the Ambulatory Surgery Unit Registration Desk and sign in at your designated time. The Ambulatory Surgery Unit is located in [facility] on the [facility] side of the [facility] across from the [facility]. Please have all of your health insurance cards, co-payment, and a photo ID.    **TWO adults may accompany you the day of the procedure.  We have limited seating available.      2. You cannot be dropped off for surgery.  Please make arrangements for a responsible adult friend or family member to remain on the hospital campus during your procedure, and drive you home, as you should not drive for 24 hours following anesthesia. Make arrangements for a responsible adult to stay with you for at least the first 24 hours after your surgery.    3. Do not have anything to eat or drink (including water, gum, mints, coffee, juice) after 11:59 PM on Geronimo 3/23. This may not apply to medications prescribed by your physician.  (Please note below the special instructions with medications to take the morning of surgery, if applicable.)      You can brush your teeth am of surgery     4. We recommend you do not drink any alcoholic beverages for 24 hours before and after your surgery.    5. Contact your surgeon’s office for instructions on the following medications: non-steroidal anti-inflammatory drugs (i.e. Advil, Aleve), vitamins, and supplements. (Some surgeon’s will want you to stop these medications prior to surgery and others may allow you to take them)   **If you are currently taking Plavix, Coumadin, Aspirin and/or other blood-thinning agents, contact your surgeon for instructions.** Your surgeon will partner with the physician

## 2025-03-21 ENCOUNTER — ANESTHESIA EVENT (OUTPATIENT)
Facility: HOSPITAL | Age: 40
End: 2025-03-21
Payer: MEDICAID

## 2025-03-21 NOTE — H&P
Subjective:   Patient ID: Delmi Kwok is a 39 y.o. female.     Hand-dominance: right        Chief Complaint: Pain of the Left Elbow      History of present illness:      This is a 39-year-old right-hand-dominant female who presents today with left hand numbness.  Began in July of last year.  She did start a new job at onset.  No injury.  Involves the ring and small finger.  No neck pain.  No history of diabetes.  Has tried a brace.  Pain is 3/10 at rest today.     Underwent an EMG on 2/6/2025.  This is reviewed by myself.  This is notable for a severe left cubital tunnel syndrome with active denervation in the first dorsal interossei.  No evidence of left median neuropathy.     Pain rating = 3  out of 10     ROS, PMHx, and SocHx reviewed with no changes.       Review of Systems   2/25/2025     Constitutional: Unexplained: Negative  Genitourinary: Frequent Urination: Negative  HEENT: Vision Loss: Negative  Neurological: Memory Loss: Negative  Integumentary: Rash: Negative  Cardiovascular: Palpatations: Negative  Hematologic: Bruises/Bleeds Easily: Negative  Gastrointestinal: Constipation: Negative  Immunological: Seasonal Allergies: Positive  Musculoskeletal: Joint Pain: Positive        Current Outpatient Medications:     diclofenac (VOLTAREN) 75 MG EC tablet, Take 1 tablet (75 mg total) by mouth 2 (two) times a day as needed (Pain), Disp: 60 tablet, Rfl: 1    levothyroxine sodium (TIROSINT) 112 MCG capsule, , Disp: , Rfl:     modafinil (PROVIGIL) 200 MG tablet, TAKE 1/2 TO 1 TABLET BY MOUTH EVERY MORNING. MAY TAKE AN ADDITIONAL 1/2 TO 1 TABLET AT NOONTIME IF NEEDED, Disp: , Rfl:     Ocrelizumab 300 MG/10ML solution, Infuse into a venous catheter, Disp: , Rfl:           Allergies   Allergen Reactions    Amoxicillin Rash         Medical History        Past Medical History:   Diagnosis Date    Autoimmune disease              Surgical History         Past Surgical History:   Procedure Laterality Date    HAND SURGERY

## 2025-03-24 ENCOUNTER — HOSPITAL ENCOUNTER (OUTPATIENT)
Facility: HOSPITAL | Age: 40
Setting detail: OUTPATIENT SURGERY
Discharge: HOME | End: 2025-03-24
Attending: ORTHOPAEDIC SURGERY | Admitting: ORTHOPAEDIC SURGERY
Payer: MEDICAID

## 2025-03-24 ENCOUNTER — ANESTHESIA (OUTPATIENT)
Facility: HOSPITAL | Age: 40
End: 2025-03-24
Payer: MEDICAID

## 2025-03-24 VITALS
WEIGHT: 198 LBS | OXYGEN SATURATION: 100 % | TEMPERATURE: 97.2 F | HEART RATE: 69 BPM | SYSTOLIC BLOOD PRESSURE: 108 MMHG | DIASTOLIC BLOOD PRESSURE: 64 MMHG | RESPIRATION RATE: 15 BRPM | BODY MASS INDEX: 32.99 KG/M2 | HEIGHT: 65 IN

## 2025-03-24 DIAGNOSIS — G89.18 POST-OP PAIN: Primary | ICD-10-CM

## 2025-03-24 LAB — HCG UR QL: NEGATIVE

## 2025-03-24 PROCEDURE — 6360000002 HC RX W HCPCS: Performed by: NURSE ANESTHETIST, CERTIFIED REGISTERED

## 2025-03-24 PROCEDURE — 81025 URINE PREGNANCY TEST: CPT

## 2025-03-24 PROCEDURE — 7100000011 HC PHASE II RECOVERY - ADDTL 15 MIN: Performed by: ORTHOPAEDIC SURGERY

## 2025-03-24 PROCEDURE — 2580000003 HC RX 258: Performed by: ANESTHESIOLOGY

## 2025-03-24 PROCEDURE — 3700000001 HC ADD 15 MINUTES (ANESTHESIA): Performed by: ORTHOPAEDIC SURGERY

## 2025-03-24 PROCEDURE — 7100000010 HC PHASE II RECOVERY - FIRST 15 MIN: Performed by: ORTHOPAEDIC SURGERY

## 2025-03-24 PROCEDURE — 7100000000 HC PACU RECOVERY - FIRST 15 MIN: Performed by: ORTHOPAEDIC SURGERY

## 2025-03-24 PROCEDURE — 7100000001 HC PACU RECOVERY - ADDTL 15 MIN: Performed by: ORTHOPAEDIC SURGERY

## 2025-03-24 PROCEDURE — 3600000003 HC SURGERY LEVEL 3 BASE: Performed by: ORTHOPAEDIC SURGERY

## 2025-03-24 PROCEDURE — 3700000000 HC ANESTHESIA ATTENDED CARE: Performed by: ORTHOPAEDIC SURGERY

## 2025-03-24 PROCEDURE — 6360000002 HC RX W HCPCS: Performed by: ORTHOPAEDIC SURGERY

## 2025-03-24 PROCEDURE — 2709999900 HC NON-CHARGEABLE SUPPLY: Performed by: ORTHOPAEDIC SURGERY

## 2025-03-24 PROCEDURE — 6360000002 HC RX W HCPCS: Performed by: STUDENT IN AN ORGANIZED HEALTH CARE EDUCATION/TRAINING PROGRAM

## 2025-03-24 PROCEDURE — 2500000003 HC RX 250 WO HCPCS: Performed by: ORTHOPAEDIC SURGERY

## 2025-03-24 PROCEDURE — 3600000013 HC SURGERY LEVEL 3 ADDTL 15MIN: Performed by: ORTHOPAEDIC SURGERY

## 2025-03-24 RX ORDER — KETOROLAC TROMETHAMINE 30 MG/ML
30 INJECTION, SOLUTION INTRAMUSCULAR; INTRAVENOUS
Status: DISCONTINUED | OUTPATIENT
Start: 2025-03-24 | End: 2025-03-24 | Stop reason: HOSPADM

## 2025-03-24 RX ORDER — SODIUM CHLORIDE 0.9 % (FLUSH) 0.9 %
5-40 SYRINGE (ML) INJECTION EVERY 12 HOURS SCHEDULED
Status: DISCONTINUED | OUTPATIENT
Start: 2025-03-24 | End: 2025-03-24 | Stop reason: HOSPADM

## 2025-03-24 RX ORDER — MIDAZOLAM HYDROCHLORIDE 1 MG/ML
INJECTION, SOLUTION INTRAMUSCULAR; INTRAVENOUS
Status: COMPLETED | OUTPATIENT
Start: 2025-03-24 | End: 2025-03-24

## 2025-03-24 RX ORDER — MIDAZOLAM HYDROCHLORIDE 1 MG/ML
INJECTION, SOLUTION INTRAMUSCULAR; INTRAVENOUS
Status: COMPLETED
Start: 2025-03-24 | End: 2025-03-24

## 2025-03-24 RX ORDER — SODIUM CHLORIDE 9 MG/ML
INJECTION, SOLUTION INTRAVENOUS PRN
Status: DISCONTINUED | OUTPATIENT
Start: 2025-03-24 | End: 2025-03-24 | Stop reason: HOSPADM

## 2025-03-24 RX ORDER — CEFAZOLIN SODIUM 1 G/3ML
INJECTION, POWDER, FOR SOLUTION INTRAMUSCULAR; INTRAVENOUS
Status: DISCONTINUED
Start: 2025-03-24 | End: 2025-03-24 | Stop reason: HOSPADM

## 2025-03-24 RX ORDER — OXYCODONE HYDROCHLORIDE 5 MG/1
5 TABLET ORAL PRN
Status: DISCONTINUED | OUTPATIENT
Start: 2025-03-24 | End: 2025-03-24 | Stop reason: HOSPADM

## 2025-03-24 RX ORDER — NALOXONE HYDROCHLORIDE 0.4 MG/ML
INJECTION, SOLUTION INTRAMUSCULAR; INTRAVENOUS; SUBCUTANEOUS PRN
Status: DISCONTINUED | OUTPATIENT
Start: 2025-03-24 | End: 2025-03-24 | Stop reason: HOSPADM

## 2025-03-24 RX ORDER — OXYCODONE HYDROCHLORIDE 5 MG/1
10 TABLET ORAL PRN
Status: DISCONTINUED | OUTPATIENT
Start: 2025-03-24 | End: 2025-03-24 | Stop reason: HOSPADM

## 2025-03-24 RX ORDER — ONDANSETRON 2 MG/ML
4 INJECTION INTRAMUSCULAR; INTRAVENOUS
Status: DISCONTINUED | OUTPATIENT
Start: 2025-03-24 | End: 2025-03-24 | Stop reason: HOSPADM

## 2025-03-24 RX ORDER — DEXAMETHASONE SODIUM PHOSPHATE 4 MG/ML
INJECTION, SOLUTION INTRA-ARTICULAR; INTRALESIONAL; INTRAMUSCULAR; INTRAVENOUS; SOFT TISSUE
Status: DISCONTINUED | OUTPATIENT
Start: 2025-03-24 | End: 2025-03-24 | Stop reason: SDUPTHER

## 2025-03-24 RX ORDER — SODIUM CHLORIDE, SODIUM LACTATE, POTASSIUM CHLORIDE, CALCIUM CHLORIDE 600; 310; 30; 20 MG/100ML; MG/100ML; MG/100ML; MG/100ML
INJECTION, SOLUTION INTRAVENOUS CONTINUOUS
Status: DISCONTINUED | OUTPATIENT
Start: 2025-03-24 | End: 2025-03-24 | Stop reason: HOSPADM

## 2025-03-24 RX ORDER — ACETAMINOPHEN 500 MG
1000 TABLET ORAL
Status: DISCONTINUED | OUTPATIENT
Start: 2025-03-24 | End: 2025-03-24 | Stop reason: HOSPADM

## 2025-03-24 RX ORDER — SODIUM CHLORIDE 0.9 % (FLUSH) 0.9 %
5-40 SYRINGE (ML) INJECTION PRN
Status: DISCONTINUED | OUTPATIENT
Start: 2025-03-24 | End: 2025-03-24 | Stop reason: HOSPADM

## 2025-03-24 RX ORDER — LIDOCAINE HYDROCHLORIDE 10 MG/ML
1 INJECTION, SOLUTION EPIDURAL; INFILTRATION; INTRACAUDAL; PERINEURAL
Status: DISCONTINUED | OUTPATIENT
Start: 2025-03-24 | End: 2025-03-24 | Stop reason: HOSPADM

## 2025-03-24 RX ORDER — FENTANYL CITRATE 50 UG/ML
25 INJECTION, SOLUTION INTRAMUSCULAR; INTRAVENOUS EVERY 5 MIN PRN
Status: DISCONTINUED | OUTPATIENT
Start: 2025-03-24 | End: 2025-03-24 | Stop reason: HOSPADM

## 2025-03-24 RX ORDER — ONDANSETRON 2 MG/ML
INJECTION INTRAMUSCULAR; INTRAVENOUS
Status: DISCONTINUED | OUTPATIENT
Start: 2025-03-24 | End: 2025-03-24 | Stop reason: SDUPTHER

## 2025-03-24 RX ORDER — WATER 10 ML/10ML
INJECTION INTRAMUSCULAR; INTRAVENOUS; SUBCUTANEOUS
Status: DISCONTINUED
Start: 2025-03-24 | End: 2025-03-24 | Stop reason: HOSPADM

## 2025-03-24 RX ORDER — HYDROCODONE BITARTRATE AND ACETAMINOPHEN 5; 325 MG/1; MG/1
1 TABLET ORAL EVERY 6 HOURS PRN
Qty: 20 TABLET | Refills: 0 | Status: SHIPPED | OUTPATIENT
Start: 2025-03-24 | End: 2025-03-29

## 2025-03-24 RX ADMIN — WATER 2000 MG: 1 INJECTION INTRAMUSCULAR; INTRAVENOUS; SUBCUTANEOUS at 11:11

## 2025-03-24 RX ADMIN — ONDANSETRON 4 MG: 2 INJECTION, SOLUTION INTRAMUSCULAR; INTRAVENOUS at 11:16

## 2025-03-24 RX ADMIN — MIDAZOLAM HYDROCHLORIDE 2 MG: 1 INJECTION, SOLUTION INTRAMUSCULAR; INTRAVENOUS at 10:20

## 2025-03-24 RX ADMIN — PROPOFOL 30 MG: 10 INJECTION, EMULSION INTRAVENOUS at 11:29

## 2025-03-24 RX ADMIN — PROPOFOL 50 MG: 10 INJECTION, EMULSION INTRAVENOUS at 11:10

## 2025-03-24 RX ADMIN — PROPOFOL 20 MG: 10 INJECTION, EMULSION INTRAVENOUS at 11:17

## 2025-03-24 RX ADMIN — SODIUM CHLORIDE, SODIUM LACTATE, POTASSIUM CHLORIDE, AND CALCIUM CHLORIDE: .6; .31; .03; .02 INJECTION, SOLUTION INTRAVENOUS at 09:58

## 2025-03-24 RX ADMIN — PROPOFOL 200 MCG/KG/MIN: 10 INJECTION, EMULSION INTRAVENOUS at 11:09

## 2025-03-24 RX ADMIN — DEXAMETHASONE SODIUM PHOSPHATE 4 MG: 4 INJECTION, SOLUTION INTRAMUSCULAR; INTRAVENOUS at 11:15

## 2025-03-24 RX ADMIN — MEPIVACAINE HYDROCHLORIDE 30 ML: 15 INJECTION, SOLUTION EPIDURAL; INFILTRATION at 10:20

## 2025-03-24 NOTE — PERIOP NOTE
Delmi Burchley  1985  [id number]    Situation:  Verbal report given from: RN AND CRNA  Procedure: Procedure(s):  LEFT CUBITAL TUNNEL RELEASE WITH TRANSPOSITION (MAC WITH REGIONAL BLOCK)    Background:    Preoperative diagnosis: Cubital tunnel syndrome on left [G56.22]    Postoperative diagnosis: * No post-op diagnosis entered *    :  Dr. UMANZOR    Assistant(s): Circulator: Jazzy Alfonso RN  Relief Circulator: Marielena Reynolds RN  Relief Scrub: Bassam Echols RN  Scrub Person First: Phillip Mg  Physician Assistant: Michelle Franklin PA-C    Specimens: * No specimens in log *    Assessment:  Intra-procedure medications         Anesthesia gave intra-procedure sedation and medications, see anesthesia flow sheet     Intravenous fluids: LR@ KVO     Vital signs stable. Pt denies pain or chill at 1210      Recommendation:    Permission to share finding with Zoë :  yes

## 2025-03-24 NOTE — OP NOTE
PATIENT NAME:  Delmi Kwok     SURGEON:    Phillip Torres MD     DATE OF SURGERY: 3/24/25      LOCATION: [facility]      PREOPERATIVE DIAGNOSIS: left cubital tunnel syndrome      POSTOPERATIVE DIAGNOSIS:  Same     PROCEDURE: left cubital tunnel release with anterior subcutaneous transposition     ANESTHESIA: Brachial Plexus block/IV sedation      BLOOD LOSS:  Minimal     COMPLICATIONS: none      TOURNIQUET TIME: 25 min    Assistant: Michelle Franklin PA-C       OPERATIVE INDICATIONS:  They had developed persistent ulnar neuropathy at the elbow.  An EMG had been obtained which proved this.  They failed non operative management.  They were therefore indicated for surgery. After risks benefits alternatives were discussed with the patient, they consented to proceed.     DESCRIPTION  OF PROCEDURE:   On the date of operation the patient presented stable to the holding area. The correct upper extremity was identified and marked.  Regional anesthesia was induced by the anesthesia team.  They were then brought to the operating room and placed supine with the operative upper extremity on a hand table. The upper extremity was then prepped and draped in the standard sterile fashion.  After formal time-out was performed, the upper extremity was elevated and exsanguinated and a sterile upper arm tourniquet was inflated to 250 mm of mercury.     An incision was then made at the medial elbow just posterior to the medial epicondyle curving proximally along the medial intermuscular septum and distally between the 2 heads of the FCU.  Skin and subcutaneous tissue were taken down sharply and deeper tissues were carefully dissected with care to avoid any damage to the medial antebrachial cutaneous nerve branches.  The ulnar nerve was then located just proximal to Fletcher's ligament. It was released from Fletcher's ligament and then released in its entirety both proximally and distally to this.  It was carried distally through the 2

## 2025-03-24 NOTE — PERIOP NOTE
Pt. Alert. Denies pain or chill. Discharge instructions reviewed with caregiver and patient. Allowed and answered questions. Tolerating PO fluids. Both state ready for discharge. Pt states she does not use assistive devices at home but stressed with anesthesia and LUE blocked that easy to fall. To face handrail and go up slowly holding with RUE. 1305 Pt dressed, escorted  to BR with LUE in sling. Discharged to car without incident.

## 2025-03-24 NOTE — DISCHARGE INSTRUCTIONS
[facility]  Post-operative instructions  For: Delmi Kwok    Your first postop appointment should be scheduled with Dr. Torres for 2 weeks post-op.    [facility]  [address], [address]  [place] [address]  Phone: [phone]  Fax: [phone]    Please follow these instructions for a safe and speedy recovery:    1. Surgical Bandage: Leave the bandage in place until we remove it. Please keep it clean and dry. To shower or bathe, apply a plastic bag or GLAD Press'n Seal® plastic wrap around the bandage or simply sponge bathe.    2. Elevation: Hand swelling is best prevented by keeping your hand elevated above the level of your heart at all times, night and day. The opposite, dangling your hand below your waist, will cause additional pain, swelling, and later stiffness. You can elevate the hand in a sling or by propping it on a pillow at night. Ice compresses may help but do not rep[lace elevation. Frequently, extreme pain is caused by a tight bandage, which should be loosened. If pain is severe and progressive, call us at [phone] during the day (ask for immediate connection to Dr. Torres's Team) or during the night [phone] (ask for the on-call physician).    3. Medication: You will be provided with an appropriate pain medication (over-the-counter or prescription). Please fill this at a pharmacy promptly so you will have it available when all local anesthetic wears off. Take this to relieve pain as directed on the bottle. Please refrain from driving, drinking alcohol, and making important medical decisions while taking the medication. Please call us if you need something stronger. Medication changes or refills must be made before 5pm or through your pharmacy.    4. Weight bearing: Do NOT bear any weight on the operative extremity.    I want to thank you for choosing us for your hand care needs. My staff and

## 2025-03-24 NOTE — ANESTHESIA PRE PROCEDURE
Department of Anesthesiology  Preprocedure Note       Name:  Delmi Kwok   Age:  39 y.o.  :  1985                                          MRN:  [id number]         Date:  3/24/2025      Surgeon: Surgeon(s):  Phillip Torres MD    Procedure: Procedure(s):  LEFT CUBITAL TUNNEL RELEASE WITH POSSIBLE TRANSPOSITION (MAC WITH REGIONAL BLOCK)    Medications prior to admission:   Prior to Admission medications    Medication Sig Start Date End Date Taking? Authorizing Provider   ferrous sulfate (IRON 325) 325 (65 Fe) MG tablet Take 1 tablet by mouth daily (with breakfast)   Yes ProviderParminder MD   levothyroxine (SYNTHROID) 112 MCG tablet Take 1 tab daily , pls give 90 day supply  Patient taking differently: Take 1 tablet by mouth every morning (before breakfast) Take 1 tab daily , pls give 90 day supply 24  Yes Liudmila Santos MD   sodium chloride 0.9 % SOLN 250 mL with ocrelizumab 300 MG/10ML SOLN Infuse intravenously every 6 months   Yes ProviderParminder MD   vitamin D3 (CHOLECALCIFEROL) 125 MCG (5000 UT) TABS tablet Take 1 tablet by mouth daily   Yes Automatic Reconciliation, Ar   diclofenac (VOLTAREN) 75 MG EC tablet Take 1 tablet by mouth 2 times daily ceived the following from [facility]: Outside name: diclofenac EC (VOLTAREN) 75 mg EC tablet 10/13/22  Yes Automatic Reconciliation, Ar   modafinil (PROVIGIL) 200 MG tablet TAKE 1/2 TO 1 TABLET BY MOUTH EVERY MORNING. MAY TAKE AN ADDITIONAL 1/2 TO 1 TABLET AT NOONTIME IF NEEDED 22  Yes Automatic Reconciliation, Ar       Current medications:    No current facility-administered medications for this encounter.     Current Outpatient Medications   Medication Sig Dispense Refill   • ferrous sulfate (IRON 325) 325 (65 Fe) MG tablet Take 1 tablet by mouth daily (with breakfast)     • levothyroxine (SYNTHROID) 112 MCG tablet Take 1 tab daily , pls give 90 day supply (Patient taking differently: Take 1 tablet by mouth every

## 2025-03-24 NOTE — ANESTHESIA POSTPROCEDURE EVALUATION
Department of Anesthesiology  Postprocedure Note    Patient: Delmi Kwok  MRN: [id number]  YOB: 1985  Date of evaluation: 3/24/2025    Procedure Summary       Date: 03/24/25 Room / Location: [facility]  / [facility] OR    Anesthesia Start: 1106 Anesthesia Stop: 1207    Procedure: LEFT CUBITAL TUNNEL RELEASE WITH TRANSPOSITION (MAC WITH REGIONAL BLOCK) (Left: Elbow) Diagnosis:       Cubital tunnel syndrome on left      (Cubital tunnel syndrome on left [G56.22])    Surgeons: Phillip Torres MD Responsible Provider: Boby Marquez MD    Anesthesia Type: MAC ASA Status: 2            Anesthesia Type: MAC    Fabiano Phase I: Fabiano Score: 7    Fabiano Phase II:      Anesthesia Post Evaluation    Patient location during evaluation: PACU  Patient participation: complete - patient participated  Level of consciousness: awake and alert  Airway patency: patent  Nausea & Vomiting: no nausea and no vomiting  Cardiovascular status: hemodynamically stable  Respiratory status: acceptable  Hydration status: stable    No notable events documented.

## 2025-03-24 NOTE — H&P
Hand-dominance: right        Chief Complaint: Pain of the Left Elbow      History of present illness:      This is a 39-year-old right-hand-dominant female who presents today with left hand numbness.  Began in July of last year.  She did start a new job at onset.  No injury.  Involves the ring and small finger.  No neck pain.  No history of diabetes.  Has tried a brace.  Pain is 3/10 at rest today.     Underwent an EMG on 2/6/2025.  This is reviewed by myself.  This is notable for a severe left cubital tunnel syndrome with active denervation in the first dorsal interossei.  No evidence of left median neuropathy.     Pain rating = 3  out of 10      ROS, PMHx, and SocHx reviewed with no changes.        Review of Systems   2/25/2025     Constitutional: Unexplained: Negative  Genitourinary: Frequent Urination: Negative  HEENT: Vision Loss: Negative  Neurological: Memory Loss: Negative  Integumentary: Rash: Negative  Cardiovascular: Palpatations: Negative  Hematologic: Bruises/Bleeds Easily: Negative  Gastrointestinal: Constipation: Negative  Immunological: Seasonal Allergies: Positive  Musculoskeletal: Joint Pain: Positive        Current Outpatient Medications:     diclofenac (VOLTAREN) 75 MG EC tablet, Take 1 tablet (75 mg total) by mouth 2 (two) times a day as needed (Pain), Disp: 60 tablet, Rfl: 1    levothyroxine sodium (TIROSINT) 112 MCG capsule, , Disp: , Rfl:     modafinil (PROVIGIL) 200 MG tablet, TAKE 1/2 TO 1 TABLET BY MOUTH EVERY MORNING. MAY TAKE AN ADDITIONAL 1/2 TO 1 TABLET AT NOONTIME IF NEEDED, Disp: , Rfl:     Ocrelizumab 300 MG/10ML solution, Infuse into a venous catheter, Disp: , Rfl:              Allergies   Allergen Reactions    Amoxicillin Rash         Medical History           Past Medical History:   Diagnosis Date    Autoimmune disease              Surgical History             Past Surgical History:   Procedure Laterality Date    HAND SURGERY        NO RELEVANT SURGERIES                Social

## 2025-03-24 NOTE — ANESTHESIA PROCEDURE NOTES
Peripheral Block    Patient location during procedure: pre-op  Reason for block: primary anesthetic and at surgeon's request  Start time: 3/24/2025 10:20 AM  End time: 3/24/2025 10:27 AM  Staffing  Performed: anesthesiologist   Anesthesiologist: Boby Marquez MD  Performed by: Boby Marquez MD  Authorized by: Boby Marquez MD    Preanesthetic Checklist  Completed: patient identified, IV checked, site marked, risks and benefits discussed, surgical/procedural consents, equipment checked, pre-op evaluation, timeout performed, anesthesia consent given, oxygen available, monitors applied/VS acknowledged, fire risk safety assessment completed and verbalized and blood product R/B/A discussed and consented  Peripheral Block   Patient position: supine  Prep: ChloraPrep  Provider prep: mask and sterile gloves  Patient monitoring: cardiac monitor, continuous pulse ox, frequent blood pressure checks, IV access, oxygen and responsive to questions  Block type: Brachial plexus  Supraclavicular  Laterality: left  Injection technique: single-shot  Guidance: ultrasound guided    Needle   Needle type: insulated echogenic nerve stimulator needle   Needle gauge: 21 G  Needle localization: ultrasound guidance  Needle length: 5 cm  Assessment   Injection assessment: negative aspiration for heme, no paresthesia on injection, local visualized surrounding nerve on ultrasound and no intravascular symptoms  Paresthesia pain: none  Hemodynamics: stable  Outcomes: uncomplicated and patient tolerated procedure well    Medications Administered  midazolam (VERSED) injection 2 mg/2mL - IntraVENous   2 mg - 3/24/2025 10:20:00 AM  mepivacaine (CARBOCAINE) injection 1.5% - Perineural   30 mL - 3/24/2025 10:20:00 AM

## (undated) DEVICE — SOLUTION IRRIG 1000ML 09% SOD CHL USP PIC PLAS CONTAINER

## (undated) DEVICE — GLOVE SURG SZ 65 THK91MIL LTX FREE SYN POLYISOPRENE

## (undated) DEVICE — GLOVE SURG SZ 7 L12IN FNGR THK79MIL GRN LTX FREE

## (undated) DEVICE — SUTURE MONOCRYL SZ 3-0 L27IN ABSRB UD L19MM PS-2 3/8 CIR PRIM Y427H

## (undated) DEVICE — STRIP SKIN CLSR W0.25XL4IN WHT SPUNBOUND FBR NYL HI ADH

## (undated) DEVICE — ZIMMER® STERILE DISPOSABLE TOURNIQUET CUFF WITH PLC, DUAL PORT, SINGLE BLADDER, 18 IN. (46 CM)

## (undated) DEVICE — DUAL HOSE W/CPC CONNECTORS: Brand: A.T.S.® TOURNIQUET SYSTEM

## (undated) DEVICE — BANDAGE COBAN 4 IN COMPR W4INXL5YD FOAM COHESIVE QUIK STK SELF ADH SFT

## (undated) DEVICE — BANDAGE COMPR W4INXL5YD WHT BGE POLY COT M E WRP WV HK AND

## (undated) DEVICE — SUTURE ETHIBOND EXCEL SZ 3-0 L36IN NONABSORBABLE GRN RB-1 X558H

## (undated) DEVICE — PAD,ABDOMINAL,5"X9",ST,LF,25/BX: Brand: MEDLINE INDUSTRIES, INC.

## (undated) DEVICE — TOWEL,OR,DSP,ST,BLUE,STD,4/PK,20PK/CS: Brand: MEDLINE

## (undated) DEVICE — GLOVE ORANGE PI 7   MSG9070

## (undated) DEVICE — GOWN,SIRUS,NONRNF,SETINSLV,XL,20/CS: Brand: MEDLINE

## (undated) DEVICE — MASTISOL ADHESIVE LIQ 2/3ML

## (undated) DEVICE — CORD ES L12FT BPLR FRCP

## (undated) DEVICE — HAND-MRMCASU: Brand: MEDLINE INDUSTRIES, INC.

## (undated) DEVICE — SYRINGE MED 10ML LUERLOCK TIP W/O SFTY DISP